# Patient Record
Sex: FEMALE | Race: WHITE
[De-identification: names, ages, dates, MRNs, and addresses within clinical notes are randomized per-mention and may not be internally consistent; named-entity substitution may affect disease eponyms.]

---

## 2019-01-11 ENCOUNTER — HOSPITAL ENCOUNTER (OUTPATIENT)
Dept: HOSPITAL 92 - BICCT | Age: 34
Discharge: HOME | End: 2019-01-11
Attending: CLINICAL NURSE SPECIALIST
Payer: COMMERCIAL

## 2019-01-11 DIAGNOSIS — G44.309: Primary | ICD-10-CM

## 2019-01-11 PROCEDURE — 70470 CT HEAD/BRAIN W/O & W/DYE: CPT

## 2019-01-11 NOTE — CT
CT OF THE BRAIN WITHOUT AND WITH CONTRAST:

 

COMPARISON: 

None.

 

HISTORY: 

Posttraumatic headache.

 

TECHNIQUE: 

Multiple contiguous axial images were obtained in a CT of the brain without and with IV contrast.  Sa
gittal and coronal reformats were performed.

 

FINDINGS: 

The brain is normal in morphology and attenuation without focal lesions or confluent areas of infarct
ion.  There is no evidence of hydrocephalus, intracranial hemorrhage, or extraaxial fluid collection.
  No abnormal enhancement is seen.

 

The calvarium and overlying soft tissues are unremarkable.  The visualized paranasal sinuses and mast
oid air cells are well aerated.

 

IMPRESSION: 

No evidence of acute intracranial abnormality.

 

POS: [facility]

## 2019-01-27 ENCOUNTER — HOSPITAL ENCOUNTER (EMERGENCY)
Dept: HOSPITAL 57 - BURERS | Age: 34
Discharge: HOME | End: 2019-01-27
Payer: COMMERCIAL

## 2019-01-27 DIAGNOSIS — F31.9: ICD-10-CM

## 2019-01-27 DIAGNOSIS — F41.9: ICD-10-CM

## 2019-01-27 DIAGNOSIS — Z79.899: ICD-10-CM

## 2019-01-27 DIAGNOSIS — J20.9: Primary | ICD-10-CM

## 2019-01-27 DIAGNOSIS — F17.210: ICD-10-CM

## 2019-01-27 PROCEDURE — 71046 X-RAY EXAM CHEST 2 VIEWS: CPT

## 2019-01-27 NOTE — RAD
CHEST TWO VIEWS:

 

Date: 1-27-19

 

FINDINGS: 

The heart is normal in size and the lungs are clear. No infiltrate or effusion was seen. There was no
 evidence for pneumonia. The mediastinum appears normal. 

 

IMPRESSION: 

No acute thoracic findings

 

POS: HOME

## 2019-04-18 ENCOUNTER — HOSPITAL ENCOUNTER (EMERGENCY)
Dept: HOSPITAL 57 - BURERS | Age: 34
Discharge: HOME | End: 2019-04-18
Payer: COMMERCIAL

## 2019-04-18 DIAGNOSIS — D72.829: ICD-10-CM

## 2019-04-18 DIAGNOSIS — F41.9: ICD-10-CM

## 2019-04-18 DIAGNOSIS — F31.9: ICD-10-CM

## 2019-04-18 DIAGNOSIS — F17.210: ICD-10-CM

## 2019-04-18 DIAGNOSIS — M13.0: Primary | ICD-10-CM

## 2019-04-18 DIAGNOSIS — Z79.899: ICD-10-CM

## 2019-04-18 LAB
ALBUMIN SERPL BCG-MCNC: 4.4 G/DL (ref 3.5–5)
ALP SERPL-CCNC: 73 U/L (ref 40–150)
ALT SERPL W P-5'-P-CCNC: 16 U/L (ref 8–55)
ANION GAP SERPL CALC-SCNC: 14 MMOL/L (ref 10–20)
APTT PPP: 25.6 SEC (ref 22.9–36.1)
AST SERPL-CCNC: 13 U/L (ref 5–34)
BACTERIA UR QL AUTO: (no result) HPF
BASOPHILS # BLD AUTO: 0.1 THOU/UL (ref 0–0.2)
BASOPHILS NFR BLD AUTO: 0.4 % (ref 0–1)
BILIRUB SERPL-MCNC: 0.3 MG/DL (ref 0.2–1.2)
BUN SERPL-MCNC: 11 MG/DL (ref 7–18.7)
CALCIUM SERPL-MCNC: 9.5 MG/DL (ref 7.8–10.44)
CHLORIDE SERPL-SCNC: 101 MMOL/L (ref 98–107)
CO2 SERPL-SCNC: 27 MMOL/L (ref 22–29)
CREAT CL PREDICTED SERPL C-G-VRATE: 0 ML/MIN (ref 70–130)
EOSINOPHIL # BLD AUTO: 0 THOU/UL (ref 0–0.7)
EOSINOPHIL NFR BLD AUTO: 0.1 % (ref 0–10)
GLOBULIN SER CALC-MCNC: 2.7 G/DL (ref 2.4–3.5)
GLUCOSE SERPL-MCNC: 102 MG/DL (ref 70–105)
HGB BLD-MCNC: 12.9 G/DL (ref 12–16)
HYALINE CASTS #/AREA URNS LPF: (no result) LPF
INR PPP: 0.9
LYMPHOCYTES # BLD AUTO: 2.7 THOU/UL (ref 1.2–3.4)
LYMPHOCYTES NFR BLD AUTO: 13.9 % (ref 21–51)
MCH RBC QN AUTO: 32.5 PG (ref 27–31)
MCV RBC AUTO: 101 FL (ref 78–98)
MONOCYTES # BLD AUTO: 0.9 THOU/UL (ref 0.11–0.59)
MONOCYTES NFR BLD AUTO: 4.3 % (ref 0–10)
NEUTROPHILS # BLD AUTO: 15.9 THOU/UL (ref 1.4–6.5)
NEUTROPHILS NFR BLD AUTO: 81.3 % (ref 42–75)
PLATELET # BLD AUTO: 330 THOU/UL (ref 130–400)
POTASSIUM SERPL-SCNC: 3.8 MMOL/L (ref 3.5–5.1)
PREGU CONTROL BACKGROUND?: (no result)
PREGU CONTROL BAR APPEAR?: YES
PROTHROMBIN TIME: 12.2 SEC (ref 12–14.7)
RBC # BLD AUTO: 3.98 MILL/UL (ref 4.2–5.4)
RBC UR QL AUTO: (no result) HPF (ref 0–3)
SODIUM SERPL-SCNC: 138 MMOL/L (ref 136–145)
SP GR UR STRIP: 1.01 (ref 1–1.03)
WBC # BLD AUTO: 19.5 THOU/UL (ref 4.8–10.8)

## 2019-04-18 PROCEDURE — 81015 MICROSCOPIC EXAM OF URINE: CPT

## 2019-04-18 PROCEDURE — 71046 X-RAY EXAM CHEST 2 VIEWS: CPT

## 2019-04-18 PROCEDURE — 85730 THROMBOPLASTIN TIME PARTIAL: CPT

## 2019-04-18 PROCEDURE — 81003 URINALYSIS AUTO W/O SCOPE: CPT

## 2019-04-18 PROCEDURE — 81025 URINE PREGNANCY TEST: CPT

## 2019-04-18 PROCEDURE — 87040 BLOOD CULTURE FOR BACTERIA: CPT

## 2019-04-18 PROCEDURE — 83605 ASSAY OF LACTIC ACID: CPT

## 2019-04-18 PROCEDURE — 80053 COMPREHEN METABOLIC PANEL: CPT

## 2019-04-18 PROCEDURE — 85652 RBC SED RATE AUTOMATED: CPT

## 2019-04-18 PROCEDURE — 85610 PROTHROMBIN TIME: CPT

## 2019-04-18 PROCEDURE — 85025 COMPLETE CBC W/AUTO DIFF WBC: CPT

## 2019-04-18 PROCEDURE — 93005 ELECTROCARDIOGRAM TRACING: CPT

## 2019-04-18 NOTE — RAD
CHEST TWO VIEWS:

 

Date: 4-18-19

 

Comparison: 1-27-19

 

FINDINGS: 

The heart is normal in size and the lungs are clear. No infiltrate or effusion was seen. The trachea 
is midline. The bony structures showed no acute change. 

 

IMPRESSION: 

No acute thoracic finding.

 

POS: HOME

## 2019-09-05 ENCOUNTER — HOSPITAL ENCOUNTER (EMERGENCY)
Dept: HOSPITAL 57 - BURERS | Age: 34
Discharge: HOME | End: 2019-09-05
Payer: COMMERCIAL

## 2019-09-05 DIAGNOSIS — Z79.899: ICD-10-CM

## 2019-09-05 DIAGNOSIS — F41.9: ICD-10-CM

## 2019-09-05 DIAGNOSIS — F17.210: ICD-10-CM

## 2019-09-05 DIAGNOSIS — N93.9: Primary | ICD-10-CM

## 2019-09-05 DIAGNOSIS — R10.31: ICD-10-CM

## 2019-09-05 DIAGNOSIS — F31.9: ICD-10-CM

## 2019-09-05 DIAGNOSIS — R10.32: ICD-10-CM

## 2019-09-05 LAB
ALBUMIN SERPL BCG-MCNC: 4.1 G/DL (ref 3.5–5)
ALP SERPL-CCNC: 68 U/L (ref 40–150)
ALT SERPL W P-5'-P-CCNC: 13 U/L (ref 8–55)
ANION GAP SERPL CALC-SCNC: 14 MMOL/L (ref 10–20)
AST SERPL-CCNC: 10 U/L (ref 5–34)
BASOPHILS # BLD AUTO: 0 THOU/UL (ref 0–0.2)
BASOPHILS NFR BLD AUTO: 0.1 % (ref 0–1)
BILIRUB SERPL-MCNC: 0.3 MG/DL (ref 0.2–1.2)
BUN SERPL-MCNC: 17 MG/DL (ref 7–18.7)
CALCIUM SERPL-MCNC: 9.1 MG/DL (ref 7.8–10.44)
CHLORIDE SERPL-SCNC: 108 MMOL/L (ref 98–107)
CO2 SERPL-SCNC: 20 MMOL/L (ref 22–29)
CREAT CL PREDICTED SERPL C-G-VRATE: 0 ML/MIN (ref 70–130)
EOSINOPHIL # BLD AUTO: 0 THOU/UL (ref 0–0.7)
EOSINOPHIL NFR BLD AUTO: 0 % (ref 0–10)
GLOBULIN SER CALC-MCNC: 2.8 G/DL (ref 2.4–3.5)
GLUCOSE SERPL-MCNC: 147 MG/DL (ref 70–105)
HGB BLD-MCNC: 12.8 G/DL (ref 12–16)
LIPASE SERPL-CCNC: 21 U/L (ref 8–78)
LYMPHOCYTES # BLD AUTO: 0.8 THOU/UL (ref 1.2–3.4)
LYMPHOCYTES NFR BLD AUTO: 5.7 % (ref 21–51)
MCH RBC QN AUTO: 31.8 PG (ref 27–31)
MCV RBC AUTO: 96.3 FL (ref 78–98)
MONOCYTES # BLD AUTO: 0.2 THOU/UL (ref 0.11–0.59)
MONOCYTES NFR BLD AUTO: 1.1 % (ref 0–10)
NEUTROPHILS # BLD AUTO: 13 THOU/UL (ref 1.4–6.5)
NEUTROPHILS NFR BLD AUTO: 93.1 % (ref 42–75)
PLATELET # BLD AUTO: 255 THOU/UL (ref 130–400)
POTASSIUM SERPL-SCNC: 4.1 MMOL/L (ref 3.5–5.1)
PROT UR STRIP.AUTO-MCNC: 30 MG/DL
RBC # BLD AUTO: 4.03 MILL/UL (ref 4.2–5.4)
RBC UR QL AUTO: (no result) HPF (ref 0–3)
SODIUM SERPL-SCNC: 138 MMOL/L (ref 136–145)
WBC # BLD AUTO: 14 THOU/UL (ref 4.8–10.8)

## 2019-09-05 PROCEDURE — 85025 COMPLETE CBC W/AUTO DIFF WBC: CPT

## 2019-09-05 PROCEDURE — 81015 MICROSCOPIC EXAM OF URINE: CPT

## 2019-09-05 PROCEDURE — 84702 CHORIONIC GONADOTROPIN TEST: CPT

## 2019-09-05 PROCEDURE — 83690 ASSAY OF LIPASE: CPT

## 2019-09-05 PROCEDURE — 80053 COMPREHEN METABOLIC PANEL: CPT

## 2019-09-05 PROCEDURE — 81003 URINALYSIS AUTO W/O SCOPE: CPT

## 2019-09-05 PROCEDURE — 99284 EMERGENCY DEPT VISIT MOD MDM: CPT

## 2019-10-23 ENCOUNTER — HOSPITAL ENCOUNTER (OUTPATIENT)
Dept: HOSPITAL 92 - TBSIIMAG | Age: 34
Discharge: HOME | End: 2019-10-23
Attending: NURSE PRACTITIONER
Payer: COMMERCIAL

## 2019-10-23 DIAGNOSIS — M51.87: ICD-10-CM

## 2019-10-23 DIAGNOSIS — M54.6: ICD-10-CM

## 2019-10-23 DIAGNOSIS — M48.07: ICD-10-CM

## 2019-10-23 DIAGNOSIS — M51.16: Primary | ICD-10-CM

## 2019-10-23 DIAGNOSIS — M48.061: ICD-10-CM

## 2019-10-23 DIAGNOSIS — M48.04: ICD-10-CM

## 2019-10-23 PROCEDURE — 72146 MRI CHEST SPINE W/O DYE: CPT

## 2019-10-23 PROCEDURE — 72148 MRI LUMBAR SPINE W/O DYE: CPT

## 2019-10-23 NOTE — MRI
MRI THORACIC SPINE WITHOUT CONTRAST:



HISTORY:

Pain.



COMPARISON:

None.



FINDINGS:

The visualized mediastinum is unremarkable. No obvious masses or hematoma. Lung parenchyma is unremar
kable.



No retroperitoneal mass, lymphadenopathy or hematoma. The visualized solid organs are unremarkable.



The thoracic cord has a normal size and signal intensity. No significant STIR hyperintensity to sugge
st vertebral body edema or ligamentous injury.



The thoracic cord has a normal size and signal intensity. No cord malacia. No cord expansion. The con
us medullaris terminates at the inferior aspect of T12.



T1-T2 through T7-T8: No significant posterior disc abnormality.



T8-T9: Minimal central disc bulge. No significant central canal stenosis.



T9-T10: Minimal posterior disc abnormality. No significant central canal stenosis.



T10-T11: Central/left paracentral disc protrusion. There is contact and deformity upon the left hemic
ord. Mild to moderate central canal stenosis. No cord signal abnormality.



T11-T12: Minimal left paracentral disc bulge. No significant central canal stenosis.



T12-L1: No significant central canal stenosis.



Throughout the thoracic spine, neural foramina are patent.



IMPRESSION:

Mild to moderate central canal stenosis at T10-T11.



Transcribed Date/Time: 10/23/2019 9:42 AM



Reported By: Joanie Rosa 

Electronically Signed:  10/23/2019 10:49 AM

## 2019-10-23 NOTE — MRI
MRI lumbar spine noncontrast:



HISTORY:

Lumbar radiculopathy. Pain.



COMPARISON:

None



FINDINGS:



Appropriate T1 marrow signal intensity of the lumbar vertebra. Lumbar spine vertebral body height is 
maintained. No fracture. There are type I and type II Modic changes at the L5-S1 disc

Appropriate signal intensity of the paraspinal muscles

Appropriate signal intensity visualized solid organs

Conus medullaris terminates at the inferior aspect of bowel



T12-L1:Adequate disc hydration. No significant central canal stenosis or significant neural foraminal
 narrowing



L1-L2:Adequate disc hydration. No significant central canal stenosis or significant neural foraminal 
narrowing



L2-L3:Adequate disc hydration. Minimal right paracentral disc bulge. Minimal ligament flavum thickeni
ng and facet hypertrophy. No significant canal stenosis. No significant neural foraminal narrowing.



L3-L4:Adequate disc hydration. No significant posterior disc abnormality. Minimal ligament flavum thi
ckening and facet hypertrophy. No significant central canal stenosis or significant neural

foraminal narrowing.



L4-L5:Desiccation with mild loss of disc space height. Broad-based disc bulge with a central and righ
t subarticular disc protrusion. There is slight inferior disc extrusion the right subarticular

zone. Partial obscuration traversing right L5 nerve root. Mild central canal stenosis. Minimal encroa
chment upon the left subarticular zone secondary to disc material. No significant mass effect or

contact upon the traversing left L5 nerve root. Moderate right neural foraminal narrowing. Mild left 
foraminal narrowing



L5-S1:Desiccation with moderate loss of disc space height. Broad-based disc bulge with a central disc
 protrusion. Disc material encroaches upon both subarticular zones. No significant mass effect upon

either traversing S1 nerve root. Mild right neural foraminal narrowing. Moderate to severe left neura
l foraminal narrowing. Mild bilateral facet hypertrophy. Small amount of fluid in both facet

joints.



IMPRESSION:



1. Disc desiccation L4-L5 and L5-S1.

2. Partial obscuration traversing right L5 nerve root. Disc material encroaches upon the left articul
ar zone at L4-L5 without significant mass effect upon the traversing left L5 nerve root.

3. Moderate to severe left neural foraminal narrowing at L5-S1.

4. Moderate right neural foraminal narrowing at L4-L5.



Reported By: Joanie Rosa 

Electronically Signed:  10/23/2019 9:01 AM

## 2020-01-27 ENCOUNTER — HOSPITAL ENCOUNTER (EMERGENCY)
Dept: HOSPITAL 57 - BURERS | Age: 35
Discharge: HOME | End: 2020-01-27
Payer: COMMERCIAL

## 2020-01-27 DIAGNOSIS — F31.9: ICD-10-CM
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
## 2020-01-27 DIAGNOSIS — F41.9: ICD-10-CM

## 2020-01-27 DIAGNOSIS — M25.511: Primary | ICD-10-CM

## 2020-01-27 DIAGNOSIS — Z79.899: ICD-10-CM
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
## 2020-01-27 DIAGNOSIS — F17.210: ICD-10-CM

## 2020-01-27 PROCEDURE — 99281 EMR DPT VST MAYX REQ PHY/QHP: CPT

## 2020-04-10 ENCOUNTER — HOSPITAL ENCOUNTER (EMERGENCY)
Dept: HOSPITAL 57 - BURERS | Age: 35
Discharge: HOME | End: 2020-04-10
Payer: SELF-PAY

## 2020-04-10 DIAGNOSIS — S61.200A: Primary | ICD-10-CM

## 2020-04-10 DIAGNOSIS — F41.9: ICD-10-CM

## 2020-04-10 DIAGNOSIS — F17.210: ICD-10-CM
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
## 2020-04-10 DIAGNOSIS — F31.9: ICD-10-CM

## 2020-04-10 PROCEDURE — 12001 RPR S/N/AX/GEN/TRNK 2.5CM/<: CPT

## 2020-06-06 ENCOUNTER — HOSPITAL ENCOUNTER (EMERGENCY)
Dept: HOSPITAL 57 - BURERS | Age: 35
Discharge: HOME | End: 2020-06-06
Payer: SELF-PAY

## 2020-06-06 DIAGNOSIS — F41.9: ICD-10-CM

## 2020-06-06 DIAGNOSIS — F17.210: ICD-10-CM

## 2020-06-06 DIAGNOSIS — F15.10: ICD-10-CM

## 2020-06-06 DIAGNOSIS — R07.89: Primary | ICD-10-CM

## 2020-06-06 DIAGNOSIS — F31.9: ICD-10-CM

## 2020-06-06 DIAGNOSIS — I10: ICD-10-CM

## 2020-06-06 DIAGNOSIS — Z79.899: ICD-10-CM

## 2020-06-06 LAB
ALBUMIN SERPL BCG-MCNC: 4.1 G/DL (ref 3.5–5)
ALP SERPL-CCNC: 76 U/L (ref 40–110)
ALT SERPL W P-5'-P-CCNC: 19 U/L (ref 8–55)
ANION GAP SERPL CALC-SCNC: 14 MMOL/L (ref 10–20)
AST SERPL-CCNC: 15 U/L (ref 5–34)
BASOPHILS # BLD AUTO: 0.2 THOU/UL (ref 0–0.2)
BASOPHILS NFR BLD AUTO: 1 % (ref 0–1)
BILIRUB SERPL-MCNC: 0.3 MG/DL (ref 0.2–1.2)
BUN SERPL-MCNC: 13 MG/DL (ref 7–18.7)
CALCIUM SERPL-MCNC: 9 MG/DL (ref 7.8–10.44)
CHLORIDE SERPL-SCNC: 99 MMOL/L (ref 98–107)
CO2 SERPL-SCNC: 27 MMOL/L (ref 22–29)
CREAT CL PREDICTED SERPL C-G-VRATE: 0 ML/MIN (ref 70–130)
DRUG SCREEN CUTOFF: (no result)
EOSINOPHIL # BLD AUTO: 0.1 THOU/UL (ref 0–0.7)
EOSINOPHIL NFR BLD AUTO: 0.9 % (ref 0–10)
GLOBULIN SER CALC-MCNC: 2.6 G/DL (ref 2.4–3.5)
GLUCOSE SERPL-MCNC: 101 MG/DL (ref 70–105)
HGB BLD-MCNC: 13.2 G/DL (ref 12–16)
LYMPHOCYTES # BLD AUTO: 5.1 THOU/UL (ref 1.2–3.4)
LYMPHOCYTES NFR BLD AUTO: 30 % (ref 21–51)
MCH RBC QN AUTO: 31.4 PG (ref 27–31)
MCV RBC AUTO: 98.9 FL (ref 78–98)
MEDTOX CONTROL LINE VALID?: (no result)
MONOCYTES # BLD AUTO: 0.9 THOU/UL (ref 0.11–0.59)
MONOCYTES NFR BLD AUTO: 5.3 % (ref 0–10)
NEUTROPHILS # BLD AUTO: 10.6 THOU/UL (ref 1.4–6.5)
NEUTROPHILS NFR BLD AUTO: 62.8 % (ref 42–75)
PLATELET # BLD AUTO: 311 THOU/UL (ref 130–400)
POTASSIUM SERPL-SCNC: 3.6 MMOL/L (ref 3.5–5.1)
RBC # BLD AUTO: 4.21 MILL/UL (ref 4.2–5.4)
RBC UR QL AUTO: (no result) HPF (ref 0–3)
SODIUM SERPL-SCNC: 136 MMOL/L (ref 136–145)
WBC # BLD AUTO: 16.9 THOU/UL (ref 4.8–10.8)
WBC UR QL AUTO: (no result) HPF (ref 0–3)

## 2020-06-06 PROCEDURE — 80306 DRUG TEST PRSMV INSTRMNT: CPT

## 2020-06-06 PROCEDURE — 80053 COMPREHEN METABOLIC PANEL: CPT

## 2020-06-06 PROCEDURE — 81015 MICROSCOPIC EXAM OF URINE: CPT

## 2020-06-06 PROCEDURE — 93005 ELECTROCARDIOGRAM TRACING: CPT

## 2020-06-06 PROCEDURE — 83880 ASSAY OF NATRIURETIC PEPTIDE: CPT

## 2020-06-06 PROCEDURE — 71045 X-RAY EXAM CHEST 1 VIEW: CPT

## 2020-06-06 PROCEDURE — 84484 ASSAY OF TROPONIN QUANT: CPT

## 2020-06-06 PROCEDURE — 94760 N-INVAS EAR/PLS OXIMETRY 1: CPT

## 2020-06-06 PROCEDURE — 85025 COMPLETE CBC W/AUTO DIFF WBC: CPT

## 2020-06-06 PROCEDURE — 85379 FIBRIN DEGRADATION QUANT: CPT

## 2020-06-06 PROCEDURE — 81003 URINALYSIS AUTO W/O SCOPE: CPT

## 2020-06-06 PROCEDURE — 96374 THER/PROPH/DIAG INJ IV PUSH: CPT

## 2020-06-06 NOTE — RAD
FRONTAL RADIOGRAPH CHEST:

6/6/20

 

HISTORY: 

Chest pain. 

 

FINDINGS: 

Lungs are clear. Heart and mediastinal contours are unremarkable. 

 

IMPRESSION: 

No acute findings. 

 

POS: [facility]

## 2021-02-02 ENCOUNTER — HOSPITAL ENCOUNTER (EMERGENCY)
Dept: HOSPITAL 57 - BURERS | Age: 36
Discharge: LEFT BEFORE DISCHARGE | End: 2021-02-02
Payer: COMMERCIAL

## 2021-02-02 DIAGNOSIS — F17.210: ICD-10-CM

## 2021-02-02 DIAGNOSIS — I10: ICD-10-CM

## 2021-02-02 DIAGNOSIS — R10.11: Primary | ICD-10-CM

## 2021-02-02 PROCEDURE — 99283 EMERGENCY DEPT VISIT LOW MDM: CPT

## 2021-02-17 ENCOUNTER — HOSPITAL ENCOUNTER (EMERGENCY)
Dept: HOSPITAL 92 - ERS | Age: 36
Discharge: HOME | End: 2021-02-17
Payer: COMMERCIAL

## 2021-02-17 DIAGNOSIS — S16.1XXA: ICD-10-CM

## 2021-02-17 DIAGNOSIS — S40.021A: ICD-10-CM

## 2021-02-17 DIAGNOSIS — F17.210: ICD-10-CM

## 2021-02-17 DIAGNOSIS — I10: ICD-10-CM
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
## 2021-02-17 DIAGNOSIS — S06.0X0A: Primary | ICD-10-CM

## 2021-02-17 PROCEDURE — 72125 CT NECK SPINE W/O DYE: CPT

## 2021-02-17 PROCEDURE — 70450 CT HEAD/BRAIN W/O DYE: CPT
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
## 2021-03-07 ENCOUNTER — HOSPITAL ENCOUNTER (EMERGENCY)
Dept: HOSPITAL 92 - ERS | Age: 36
LOS: 1 days | Discharge: HOME | End: 2021-03-08
Payer: COMMERCIAL

## 2021-03-07 DIAGNOSIS — I10: ICD-10-CM

## 2021-03-07 DIAGNOSIS — F17.210: ICD-10-CM

## 2021-03-07 DIAGNOSIS — F41.0: Primary | ICD-10-CM

## 2021-03-07 PROCEDURE — 96374 THER/PROPH/DIAG INJ IV PUSH: CPT

## 2021-04-22 ENCOUNTER — HOSPITAL ENCOUNTER (EMERGENCY)
Dept: HOSPITAL 9 - MADERS | Age: 36
Discharge: HOME | End: 2021-04-22
Payer: COMMERCIAL

## 2021-04-22 DIAGNOSIS — Z79.899: ICD-10-CM

## 2021-04-22 DIAGNOSIS — F17.210: ICD-10-CM
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
## 2021-04-22 DIAGNOSIS — S00.531A: Primary | ICD-10-CM

## 2021-04-22 DIAGNOSIS — I10: ICD-10-CM

## 2021-04-22 DIAGNOSIS — S02.5XXA: ICD-10-CM

## 2021-04-22 PROCEDURE — 99283 EMERGENCY DEPT VISIT LOW MDM: CPT

## 2021-05-04 ENCOUNTER — HOSPITAL ENCOUNTER (EMERGENCY)
Dept: HOSPITAL 9 - MADERS | Age: 36
Discharge: LEFT BEFORE DISCHARGE | End: 2021-05-04
Payer: COMMERCIAL

## 2021-05-04 DIAGNOSIS — Z53.21: Primary | ICD-10-CM

## 2023-01-08 ENCOUNTER — HOSPITAL ENCOUNTER (EMERGENCY)
Dept: HOSPITAL 92 - CSHERS | Age: 38
Discharge: PSYCHIATRIC OR SUBSTANCE USE FACILITY | End: 2023-01-08
Payer: COMMERCIAL

## 2023-01-08 DIAGNOSIS — F17.210: ICD-10-CM

## 2023-01-08 DIAGNOSIS — F19.10: ICD-10-CM

## 2023-01-08 DIAGNOSIS — R45.851: Primary | ICD-10-CM

## 2023-01-08 LAB
ALBUMIN SERPL BCG-MCNC: 4 G/DL (ref 3.5–5)
ALP SERPL-CCNC: 110 U/L (ref 40–110)
ALT SERPL W P-5'-P-CCNC: 6 U/L (ref 8–55)
ANION GAP SERPL CALC-SCNC: 11 MMOL/L (ref 10–20)
APAP SERPL-MCNC: (no result) MCG/ML (ref 10–30)
AST SERPL-CCNC: 10 U/L (ref 5–34)
BASOPHILS # BLD AUTO: 0.1 10X3/UL (ref 0–0.2)
BASOPHILS NFR BLD AUTO: 0.5 % (ref 0–2)
BILIRUB SERPL-MCNC: 0.5 MG/DL (ref 0.2–1.2)
BUN SERPL-MCNC: 8 MG/DL (ref 7–18.7)
CALCIUM SERPL-MCNC: 9.2 MG/DL (ref 7.8–10.44)
CHLORIDE SERPL-SCNC: 105 MMOL/L (ref 98–107)
CK SERPL-CCNC: 55 U/L (ref 29–168)
CO2 SERPL-SCNC: 25 MMOL/L (ref 22–29)
CREAT CL PREDICTED SERPL C-G-VRATE: 0 ML/MIN (ref 70–130)
DRUG SCREEN CUTOFF: (no result)
EOSINOPHIL # BLD AUTO: 0.2 10X3/UL (ref 0–0.5)
EOSINOPHIL NFR BLD AUTO: 1.2 % (ref 0–6)
GLOBULIN SER CALC-MCNC: 2.7 G/DL (ref 2.4–3.5)
GLUCOSE SERPL-MCNC: 121 MG/DL (ref 70–105)
HGB BLD-MCNC: 13 G/DL (ref 12–15.5)
LYMPHOCYTES NFR BLD AUTO: 27.8 % (ref 18–47)
MCH RBC QN AUTO: 31.9 PG (ref 27–33)
MCV RBC AUTO: 90.7 FL (ref 81.6–98.3)
MONOCYTES # BLD AUTO: 0.9 10X3/UL (ref 0–1.1)
MONOCYTES NFR BLD AUTO: 6.6 % (ref 0–10)
NEUTROPHILS # BLD AUTO: 8.2 10X3/UL (ref 1.5–8.4)
NEUTROPHILS NFR BLD AUTO: 63.5 % (ref 40–75)
PLATELET # BLD AUTO: 286 10X3/UL (ref 150–450)
POTASSIUM SERPL-SCNC: 3.3 MMOL/L (ref 3.5–5.1)
PREGS CONTROL BACKGROUND?: (no result)
PREGS CONTROL BAR APPEAR?: YES
RBC # BLD AUTO: 4.07 10X6/UL (ref 3.9–5.03)
SALICYLATES SERPL-MCNC: (no result) MG/DL (ref 15–30)
SODIUM SERPL-SCNC: 138 MMOL/L (ref 136–145)
SP GR UR STRIP: 1 (ref 1–1.03)
WBC # BLD AUTO: 13 10X3/UL (ref 3.5–10.5)

## 2023-01-08 PROCEDURE — 84703 CHORIONIC GONADOTROPIN ASSAY: CPT

## 2023-01-08 PROCEDURE — 80307 DRUG TEST PRSMV CHEM ANLYZR: CPT

## 2023-01-08 PROCEDURE — 80306 DRUG TEST PRSMV INSTRMNT: CPT

## 2023-01-08 PROCEDURE — 99285 EMERGENCY DEPT VISIT HI MDM: CPT

## 2023-01-08 PROCEDURE — 82550 ASSAY OF CK (CPK): CPT

## 2023-01-08 PROCEDURE — 85025 COMPLETE CBC W/AUTO DIFF WBC: CPT

## 2023-01-08 PROCEDURE — 81003 URINALYSIS AUTO W/O SCOPE: CPT

## 2023-01-08 PROCEDURE — 80053 COMPREHEN METABOLIC PANEL: CPT

## 2024-11-26 ENCOUNTER — HOSPITAL ENCOUNTER (OUTPATIENT)
Dept: HOSPITAL 92 - BICRAD | Age: 39
Discharge: HOME | End: 2024-11-26
Attending: NURSE PRACTITIONER
Payer: COMMERCIAL

## 2024-11-26 DIAGNOSIS — M47.817: ICD-10-CM

## 2024-11-26 DIAGNOSIS — M16.12: ICD-10-CM

## 2024-11-26 DIAGNOSIS — M47.816: ICD-10-CM

## 2024-11-26 DIAGNOSIS — M54.42: Primary | ICD-10-CM

## 2024-11-26 PROCEDURE — 72100 X-RAY EXAM L-S SPINE 2/3 VWS: CPT
